# Patient Record
Sex: MALE | Employment: UNEMPLOYED | URBAN - METROPOLITAN AREA
[De-identification: names, ages, dates, MRNs, and addresses within clinical notes are randomized per-mention and may not be internally consistent; named-entity substitution may affect disease eponyms.]

---

## 2023-04-25 ENCOUNTER — HOSPITAL ENCOUNTER (OUTPATIENT)
Dept: MRI IMAGING | Age: 53
Discharge: HOME | End: 2023-04-25
Attending: ORTHOPAEDIC SURGERY
Payer: MEDICARE

## 2023-04-25 DIAGNOSIS — M23.92 DERANGEMENT, KNEE INTERNAL, LEFT: ICD-10-CM

## 2023-04-25 PROCEDURE — 73721 MRI JNT OF LWR EXTRE W/O DYE: CPT | Performed by: ORTHOPAEDIC SURGERY

## 2024-01-24 ENCOUNTER — HOSPITAL ENCOUNTER (OUTPATIENT)
Dept: GENERAL RADIOLOGY | Facility: HOSPITAL | Age: 54
Discharge: HOME | End: 2024-01-24
Attending: INTERNAL MEDICINE
Payer: MEDICARE

## 2024-01-24 DIAGNOSIS — M47.22 CERVICAL SPONDYLOSIS WITH RADICULOPATHY: ICD-10-CM

## 2024-01-24 PROCEDURE — 72040 X-RAY EXAM NECK SPINE 2-3 VW: CPT | Performed by: INTERNAL MEDICINE

## 2024-02-28 ENCOUNTER — APPOINTMENT (OUTPATIENT)
Dept: URBAN - METROPOLITAN AREA CLINIC 244 | Age: 54
Setting detail: DERMATOLOGY
End: 2024-02-28

## 2024-02-28 DIAGNOSIS — L663 OTHER SPECIFIED DISEASES OF HAIR AND HAIR FOLLICLES: ICD-10-CM

## 2024-02-28 DIAGNOSIS — L72.8 OTHER FOLLICULAR CYSTS OF THE SKIN AND SUBCUTANEOUS TISSUE: ICD-10-CM

## 2024-02-28 DIAGNOSIS — L738 OTHER SPECIFIED DISEASES OF HAIR AND HAIR FOLLICLES: ICD-10-CM

## 2024-02-28 PROBLEM — L02.223 FURUNCLE OF CHEST WALL: Status: ACTIVE | Noted: 2024-02-28

## 2024-02-28 PROBLEM — D48.5 NEOPLASM OF UNCERTAIN BEHAVIOR OF SKIN: Status: ACTIVE | Noted: 2024-02-28

## 2024-02-28 PROCEDURE — 11401 EXC TR-EXT B9+MARG 0.6-1 CM: CPT

## 2024-02-28 PROCEDURE — OTHER PRESCRIPTION: OTHER

## 2024-02-28 PROCEDURE — OTHER COUNSELING: OTHER

## 2024-02-28 PROCEDURE — 12031 INTMD RPR S/A/T/EXT 2.5 CM/<: CPT

## 2024-02-28 PROCEDURE — 99203 OFFICE O/P NEW LOW 30 MIN: CPT | Mod: 25

## 2024-02-28 PROCEDURE — OTHER PUNCH EXCISION: OTHER

## 2024-02-28 RX ORDER — CLINDAMYCIN PHOSPHATE 10 MG/G
GEL TOPICAL
Qty: 120 | Refills: 4 | Status: ERX | COMMUNITY
Start: 2024-02-28

## 2024-02-28 RX ORDER — DOXYCYCLINE HYCLATE 100 MG/1
CAPSULE, GELATIN COATED ORAL
Qty: 60 | Refills: 0 | Status: ERX | COMMUNITY
Start: 2024-02-28

## 2024-02-28 ASSESSMENT — LOCATION DETAILED DESCRIPTION DERM
LOCATION DETAILED: RIGHT POSTERIOR SHOULDER
LOCATION DETAILED: STERNUM

## 2024-02-28 ASSESSMENT — LOCATION ZONE DERM
LOCATION ZONE: TRUNK
LOCATION ZONE: ARM

## 2024-02-28 ASSESSMENT — LOCATION SIMPLE DESCRIPTION DERM
LOCATION SIMPLE: CHEST
LOCATION SIMPLE: RIGHT SHOULDER

## 2024-02-28 NOTE — PROCEDURE: PUNCH EXCISION
Bill 34339 For Specimen Handling/Conveyance To Laboratory?: no
Medical Necessity Clause: This procedure was medically necessary because the lesion that was treated was:
Nostril Rim Text: The closure involved the nostril rim.
8 Mm Punch Excision Text: A 8 mm punch biopsy was used to excise the lesion to the level of the subcutaneous fat.  Blunt dissection was used to free the lesion from the surrounding tissues and the lesion was removed.
Anesthesia Volume In Cc: 3
4.5 Mm Punch Excision Text: A 4.5 mm punch biopsy was used to excise the lesion to the level of the subcutaneous fat.  Blunt dissection was used to free the lesion from the surrounding tissues and the lesion was removed.
Helical Rim Text: The closure involved the helical rim.
Intermediate / Complex Repair - Final Wound Length In Cm: 1.1
12 Mm Punch Excision Text: A 12 mm punch biopsy was used to excise the lesion to the level of the subcutaneous fat.  Blunt dissection was used to free the lesion from the surrounding tissues and the lesion was removed.
Suture Removal: 14 days
Wound Care: Petrolatum
Notification Instructions: Patient will be notified of biopsy results. However, patient instructed to call the office if not contacted within 2 weeks.
2 Mm Punch Excision Text: A 2 mm punch biopsy was used to excise the lesion to the level of the subcutaneous fat.  Blunt dissection was used to free the lesion from the surrounding tissues and the lesion was removed.
Wound Dressings: a bandage
Epidermal Closure: simple interrupted
Debridement Text: The wound edges were debrided prior to proceeding with the closure to facilitate wound healing.
Post-Care Instructions: I reviewed with the patient in detail post-care instructions. Patient is to keep the biopsy site dry overnight, and then apply bacitracin twice daily until healed. Patient may apply hydrogen peroxide soaks to remove any crusting.
3 Mm Punch Excision Text: A 3 mm punch biopsy was used to excise the lesion to the level of the subcutaneous fat.  Blunt dissection was used to free the lesion from the surrounding tissues and the lesion was removed.
Path Notes (To The Dermatopathologist): Please check margins.
6 Mm Punch Excision Text: A 6 mm punch biopsy was used to excise the lesion to the level of the subcutaneous fat.  Blunt dissection was used to free the lesion from the surrounding tissues and the lesion was removed.
3.5 Mm Punch Excision Text: A 3.5 mm punch biopsy was used to excise the lesion to the level of the subcutaneous fat.  Blunt dissection was used to free the lesion from the surrounding tissues and the lesion was removed.
Undermining Type: Entire Wound
Deep Sutures: 3-0 Vicryl
Vermilion Border Text: The closure involved the vermilion border.
5 Mm Punch Excision Text: A 5 mm punch biopsy was used to excise the lesion to the level of the subcutaneous fat.  Blunt dissection was used to free the lesion from the surrounding tissues and the lesion was removed.
Retention Suture Text: Retention sutures were placed to support the closure and prevent dehiscence.
10 Mm Punch Excision Text: A 10 mm punch biopsy was used to excise the lesion to the level of the subcutaneous fat.  Blunt dissection was used to free the lesion from the surrounding tissues and the lesion was removed.
7 Mm Punch Excision Text: A 7 mm punch biopsy was used to excise the lesion to the level of the subcutaneous fat.  Blunt dissection was used to free the lesion from the surrounding tissues and the lesion was removed.
Punch Size In Mm: 6
Anesthesia Type: 0.5% lidocaine with 1:200,000 epinephrine and a 1:10 solution of 8.4% sodium bicarbonate
2.5 Mm Punch Excision Text: A 2.5 mm punch biopsy was used to excise the lesion to the level of the subcutaneous fat.  Blunt dissection was used to free the lesion from the surrounding tissues and the lesion was removed.
Epidermal Sutures: 4-0 Nylon
1.5 Mm Punch Excision Text: A 1.5 mm punch biopsy was used to excise the lesion to the level of the subcutaneous fat.  Blunt dissection was used to free the lesion from the surrounding tissues and the lesion was removed.
Size Of Margin In Cm: 0.1
Billing Type: Third-Party Bill
Lab Facility: [id number]
4 Mm Punch Excision Text: A 4 mm punch biopsy was used to excise the lesion to the level of the subcutaneous fat.  Blunt dissection was used to free the lesion from the surrounding tissues and the lesion was removed.
Hemostasis: None
Size Of Lesion (*Required): 0.4
Consent was obtained from the patient. The risks and benefits to therapy were discussed in detail. Specifically, the risks of infection, scarring, bleeding, prolonged wound healing, incomplete removal, allergy to anesthesia, nerve injury and recurrence were addressed. Prior to the procedure, the treatment site was clearly identified and confirmed by the patient.
Repair Type: Intermediate
Lab: [id number]
Detail Level: Detailed

## 2024-02-28 NOTE — HPI: RASH
How Severe Is Your Rash?: mild
Is This A New Presentation, Or A Follow-Up?: Rash
Additional History: Pt tried antibiotic for 30 days that helped last took two weeks ago

## 2024-03-14 ENCOUNTER — RX ONLY (RX ONLY)
Age: 54
End: 2024-03-14

## 2024-03-14 ENCOUNTER — APPOINTMENT (OUTPATIENT)
Dept: URBAN - METROPOLITAN AREA CLINIC 244 | Age: 54
Setting detail: DERMATOLOGY
End: 2024-03-17

## 2024-03-14 DIAGNOSIS — L738 OTHER SPECIFIED DISEASES OF HAIR AND HAIR FOLLICLES: ICD-10-CM

## 2024-03-14 DIAGNOSIS — L663 OTHER SPECIFIED DISEASES OF HAIR AND HAIR FOLLICLES: ICD-10-CM

## 2024-03-14 DIAGNOSIS — Z48.02 ENCOUNTER FOR REMOVAL OF SUTURES: ICD-10-CM

## 2024-03-14 PROBLEM — L02.223 FURUNCLE OF CHEST WALL: Status: ACTIVE | Noted: 2024-03-14

## 2024-03-14 PROCEDURE — OTHER SEPARATE AND IDENTIFIABLE DOCUMENTATION: OTHER

## 2024-03-14 PROCEDURE — OTHER PRESCRIPTION MEDICATION MANAGEMENT: OTHER

## 2024-03-14 PROCEDURE — OTHER SUTURE REMOVAL (GLOBAL PERIOD): OTHER

## 2024-03-14 PROCEDURE — OTHER COUNSELING: OTHER

## 2024-03-14 PROCEDURE — 99024 POSTOP FOLLOW-UP VISIT: CPT

## 2024-03-14 PROCEDURE — 99213 OFFICE O/P EST LOW 20 MIN: CPT | Mod: 24

## 2024-03-14 RX ORDER — CLINDAMYCIN PHOSPHATE 10 MG/G
GEL TOPICAL
Qty: 120 | Refills: 11 | Status: ERX

## 2024-03-14 RX ORDER — DOXYCYCLINE HYCLATE 100 MG/1
CAPSULE, GELATIN COATED ORAL
Qty: 60 | Refills: 2 | Status: ERX

## 2024-03-14 ASSESSMENT — LOCATION DETAILED DESCRIPTION DERM
LOCATION DETAILED: STERNUM
LOCATION DETAILED: RIGHT POSTERIOR SHOULDER

## 2024-03-14 ASSESSMENT — LOCATION ZONE DERM
LOCATION ZONE: ARM
LOCATION ZONE: TRUNK

## 2024-03-14 ASSESSMENT — LOCATION SIMPLE DESCRIPTION DERM
LOCATION SIMPLE: CHEST
LOCATION SIMPLE: RIGHT SHOULDER

## 2024-03-14 NOTE — PROCEDURE: SUTURE REMOVAL (GLOBAL PERIOD)
Detail Level: Detailed
Add 28102 Cpt? (Important Note: In 2017 The Use Of 77523 Is Being Tracked By Cms To Determine Future Global Period Reimbursement For Global Periods): yes

## 2024-03-14 NOTE — PROCEDURE: PRESCRIPTION MEDICATION MANAGEMENT
Detail Level: Zone
Render In Strict Bullet Format?: No
Continue Regimen: Clindamycin 1% topical gel BID as needed
Modify Regimen: doxycycline 100mg BID x 2-4 weeks for flares only

## 2024-07-11 ENCOUNTER — TELEPHONE (OUTPATIENT)
Dept: GASTROENTEROLOGY | Facility: CLINIC | Age: 54
End: 2024-07-11

## 2024-07-11 ENCOUNTER — OFFICE VISIT (OUTPATIENT)
Dept: GASTROENTEROLOGY | Facility: CLINIC | Age: 54
End: 2024-07-11
Payer: MEDICARE

## 2024-07-11 VITALS
SYSTOLIC BLOOD PRESSURE: 137 MMHG | DIASTOLIC BLOOD PRESSURE: 90 MMHG | WEIGHT: 211 LBS | HEART RATE: 74 BPM | BODY MASS INDEX: 29.54 KG/M2 | HEIGHT: 71 IN

## 2024-07-11 DIAGNOSIS — R10.84 DIFFUSE ABDOMINAL PAIN: ICD-10-CM

## 2024-07-11 DIAGNOSIS — K75.81 NASH (NONALCOHOLIC STEATOHEPATITIS): ICD-10-CM

## 2024-07-11 DIAGNOSIS — Z12.11 COLON CANCER SCREENING: ICD-10-CM

## 2024-07-11 DIAGNOSIS — K31.A0 GASTRIC INTESTINAL METAPLASIA: ICD-10-CM

## 2024-07-11 DIAGNOSIS — Z86.010 PERSONAL HISTORY OF COLONIC POLYPS: Primary | ICD-10-CM

## 2024-07-11 DIAGNOSIS — Z86.010 PERSONAL HISTORY OF COLONIC POLYPS: ICD-10-CM

## 2024-07-11 DIAGNOSIS — R10.9 ABDOMINAL PAIN, UNSPECIFIED ABDOMINAL LOCATION: Primary | ICD-10-CM

## 2024-07-11 PROCEDURE — 3008F BODY MASS INDEX DOCD: CPT | Performed by: INTERNAL MEDICINE

## 2024-07-11 PROCEDURE — 99204 OFFICE O/P NEW MOD 45 MIN: CPT | Performed by: INTERNAL MEDICINE

## 2024-07-11 PROCEDURE — 3075F SYST BP GE 130 - 139MM HG: CPT | Performed by: INTERNAL MEDICINE

## 2024-07-11 PROCEDURE — 3080F DIAST BP >= 90 MM HG: CPT | Performed by: INTERNAL MEDICINE

## 2024-07-11 RX ORDER — NAPROXEN 500 MG/1
1 TABLET ORAL 2 TIMES DAILY PRN
COMMUNITY
Start: 2021-10-13

## 2024-07-11 RX ORDER — OMEPRAZOLE 40 MG/1
40 CAPSULE, DELAYED RELEASE ORAL DAILY
COMMUNITY

## 2024-07-11 RX ORDER — PREDNISONE 5 MG/1
TABLET ORAL
COMMUNITY
Start: 2024-07-02

## 2024-07-11 RX ORDER — POLYETHYLENE GLYCOL 3350, SODIUM CHLORIDE, SODIUM BICARBONATE, POTASSIUM CHLORIDE 420; 11.2; 5.72; 1.48 G/4L; G/4L; G/4L; G/4L
POWDER, FOR SOLUTION ORAL
COMMUNITY
Start: 2024-06-11

## 2024-07-11 RX ORDER — FAMOTIDINE 10 MG
10 TABLET ORAL 2 TIMES DAILY
COMMUNITY

## 2024-07-11 RX ORDER — MELOXICAM 15 MG/1
15 TABLET ORAL DAILY PRN
COMMUNITY
Start: 2024-07-02

## 2024-07-11 RX ORDER — CLINDAMYCIN PHOSPHATE 10 MG/G
GEL TOPICAL
COMMUNITY
Start: 2024-07-10

## 2024-07-11 RX ORDER — ZOLPIDEM TARTRATE 5 MG/1
TABLET ORAL
COMMUNITY
Start: 2024-07-02

## 2024-07-11 RX ORDER — SILDENAFIL CITRATE 20 MG/1
TABLET ORAL
COMMUNITY
Start: 2022-07-10

## 2024-07-11 RX ORDER — LOSARTAN POTASSIUM 100 MG/1
100 TABLET ORAL DAILY
COMMUNITY
Start: 2024-04-18

## 2024-07-11 RX ORDER — DIAZEPAM 5 MG/1
TABLET ORAL
COMMUNITY
Start: 2024-07-02

## 2024-07-11 RX ORDER — ACETAMINOPHEN 500 MG
2 TABLET ORAL EVERY 6 HOURS PRN
COMMUNITY

## 2024-07-11 RX ORDER — METOPROLOL TARTRATE 100 MG/1
100 TABLET ORAL DAILY
COMMUNITY

## 2024-07-11 NOTE — H&P
[facility] - Gastroenterology                                                                                                               Reason for consult: abdominal pain, CRC screening    Requesting physician or provider: No primary care provider on file.    Chief Complaint   Patient presents with    Colonoscopy Screening    Abdominal Pain       HPI:   George Serna is a 54 year old year-old male here for the following:    Has chronic right and left upper abd pain since he was a child (10-12 years old).  It can worsen with food, capo if he eats too much meat.  His omeprazole was increased to 40 mg daily a few years ago. Pepcid was added at night as well about a few years ago, which helped.  He stopped smoking 10 years ago.      Has a h/o gastric intestinal metaplasia. Last EGD was in 2022.     He was on antibiotics for a rash which caused loose stools.  Since he completed it a week ago, his stool have been formed and normal.      Also with h/o NAFLD and used to see hepatology. He had a FibroScan 9/2022 that suggested F3 fibrosis, then had a US elastography 2023 that showed evidence of F2-F3 fibrosis.  Denies heavy alcohol use or h/o hepatitis.  LFTs were previously normal.     Patient currently denies any GI symptoms of nausea, vomiting, dyspepsia, dysphagia, hematemesis, abdominal pain, change in bowel habits, thin stools, hematochezia, or melena.  Additionally there is no weight loss and no reported history of chest pain or shortness of breath.      Denies family h/o CRC.     US liver with elastography 3/2023  Hepatic steatosis without definite hepatic mass.   Left mid pole likely renal calculus is decreased in size compared to prior exam   5/17/2022.   Fibrosis stage F2 by elastography         CT A/P 2021  IMPRESSION:  1. Bilateral renal calculi without ureteral stone or hydronephrosis.  2.  Fatty  liver.    Prior endoscopies:   CLN was in 2016 at [facility] and 2 small polyps were removed.      Soc:  -denies smoking  -denies Etoh  -no illicit drug use    Wt Readings from Last 6 Encounters:   07/11/24 211 lb (95.7 kg)        History, Medications, Allergies, ROS:      History reviewed. No pertinent past medical history.   Past Surgical History:   Procedure Laterality Date    Colonoscopy  05/20/2016    @ [facility]    Egd  01/16/2022    @ [facility]    Eg  09/28/2022    Egd  05/20/2016    @ [facility]      Family Hx:   Family History   Problem Relation Age of Onset    Cancer Mother 74        lung cancer      Social History:   Social History     Socioeconomic History    Marital status:    Tobacco Use    Smoking status: Former     Types: Cigarettes    Smokeless tobacco: Never   Substance and Sexual Activity    Alcohol use: Yes        Medications (Active prior to today's visit):  Current Outpatient Medications   Medication Sig Dispense Refill    zolpidem 5 MG Oral Tab TAKE 1 TO 2 TABLETS BY MOUTH EVERY OTHER NIGHT AT BEDTIME      sildenafil 20 MG Oral Tab       predniSONE 5 MG Oral Tab TAKE 1/2 TO 1 TABLET BY MOUTH IN THE MORNING FOR PAIN      Omeprazole 40 MG Oral Capsule Delayed Release Take 1 capsule (40 mg total) by mouth daily.      diazePAM 5 MG Oral Tab TAKE 1 OR 2 TABLETS BY MOUTH EVERY OTHER NIGHT AT BEDTIME AS NEEDED      Meloxicam 15 MG Oral Tab Take 1 tablet (15 mg total) by mouth daily as needed.      metoprolol tartrate 100 MG Oral Tab Take 1 tablet (100 mg total) by mouth daily.      losartan 100 MG Oral Tab Take 1 tablet (100 mg total) by mouth daily.      naproxen 500 MG Oral Tab Take 1 tablet (500 mg total) by mouth 2 (two) times daily as needed.      Clindamycin Phosphate 1 % External Gel       ascorbic acid 1000 MG Oral Tab Take 1 tablet (1,000 mg total) by mouth twice a week.      acetaminophen 500 MG Oral Tab Take 2 tablets (1,000 mg total) by mouth every 6 (six) hours as needed.      famotidine  10 MG Oral Tab Take 1 tablet (10 mg total) by mouth 2 (two) times daily.      PEG 3350-KCl-Na Bicarb-NaCl 420 g Oral Recon Soln MINUTES AND DRINK 8 OUNCES EVERY 15 MINUTES UNTIL DONE. START 4PM AND FINISH IN 4 HOURS         Allergies:  Allergies   Allergen Reactions    Latex RASH       ROS:   CONSTITUTIONAL:  negative for fevers, rigors  EYES:  negative for diplopia   RESPIRATORY:  negative for severe shortness of breath  CARDIOVASCULAR:  negative for crushing sub-sternal chest pain  GASTROINTESTINAL:  see HPI  GENITOURINARY:  negative for dysuria or gross hematuria  INTEGUMENT/BREAST:  SKIN:  negative for jaundice   ALLERGIC/IMMUNOLOGIC:  negative for hay fever  ENDOCRINE:  negative for cold intolerance and heat intolerance  MUSCULOSKELETAL:  negative for joint effusion/severe erythema  BEHAVIOR/PSYCH:  negative for psychotic behavior      PHYSICAL EXAM:   Blood pressure 137/90, pulse 74, height 5' 11\" (1.803 m), weight 211 lb (95.7 kg).    GEN - Patient appears comfortable and in no acute discomfort  ENT - MMM  EYES - the sclera appears anicteric  CV - no edema  RESP -  No increased work of breathing  ABDOMEN - soft, non-tender exam in all quadrants without rigidity or guarding, non-distended, no abnormal bowel sounds noted, no masses are palpated  SKIN - No jaundice  NEURO - Alert and appropriate, and gross movements of extremities normal  PSYCH - normal affect, non-agitated    Labs/Imaging:     Patient's pertinent labs and imaging were reviewed and discussed with patient today.      .  ASSESSMENT/PLAN:   George Serna is a 54 year old year-old male here for the following:    Chronic abdominal pain, GERD - the patient has chronic right and left upper abd pain since he was a child (10-12 years old).  It can worsen with food, capo if he eats too much meat.  His omeprazole was increased to 40 mg daily a few years ago. Pepcid was added at night as well about a few years ago, which helped.  Recommend continuing  current GERD regimen.      Gastric intestinal metaplasia -  Last EGD was in 2022. Recommend repeat EGD for mapping/surveillance in 2025.      NAFLD - used to see hepatology. He had a FibroScan 9/2022 that suggested F3 fibrosis, then had a US elastography 2023 that showed evidence of F2-F3 fibrosis.  Denies heavy alcohol use or h/o hepatitis.  LFTs were previously normal in 2023. Will recheck LFTs and order US elastography annually given previously borderline fibrosis.      Diarrhea - was self limited and likely related to the abx. Stools are now normal. Recommend continuing to monitor. If it recurs, recommend checking C.diff and stool cultures.    CRC surveillance, h/o colon polyps - last CLN was in 2016 and polyps were removed. Pt was advised to repeat a CLN in 5 years.  CLN ordered. He already has a prep at home from another provider.     Recommend    - CLN with [facility]    - CLD the day prior, NPO after midnight, split dose prep - pt already has one at home    - pt due for EGD in 2025    - LFTs and US liver elastography    - Follow up once the work-up above is completed     Colonoscopy consent: I have discussed the risks, benefits, and alternatives to colonoscopy with the patient/primary decision maker [who demonstrated understanding], including but not limited to the risks of bleeding, infection, pain, death, as well as the risks of anesthesia and perforation all leading to prolonged hospitalization, surgical intervention, or even death. I also specifically mentioned the miss rate of colonoscopy of 5-10% in the best of all circumstances. The patient has agreed to sign an informed consent and elected to proceed with colonoscopy with possible intervention [i.e. polypectomy, stent placement, etc.] as indicated.        Orders This Visit:  Orders Placed This Encounter   Procedures    Hepatic Function Panel (7)       Meds This Visit:  Requested Prescriptions      No prescriptions requested or ordered in this encounter        Imaging & Referrals:  US LIVER WITH ELASTOGRAPHY(CPT=76705,80777)         Hoda Noyola MD          This note was partially prepared using Dragon Medical voice recognition dictation software. As a result, errors may occur. When identified, these errors have been corrected. While every attempt is made to correct errors during dictation, discrepancies may still exist.

## 2024-07-11 NOTE — PATIENT INSTRUCTIONS
1. Schedule colonoscopy with [facility] [Diagnosis: abdominal pain, history of colon polyps]    2.  bowel prep from pharmacy (split dose prep you have at home)    3. Continue all medications for procedure    4. Read all bowel prep instructions carefully    5. AVOID seeds, nuts, popcorn, raw fruits and vegetables (cooked is okay) for 2-3 days before procedure    6. If you start any NEW medication after your visit today, please notify us. Certain medications will need to be held before the procedure, or the procedure cannot be performed.     7. You are due for a repeat EGD next year, 11/2025    8.  Please have the blood test and ultrasound done when able    9. Follow up with Dr. Noyola once these tests and the colonoscopy are done

## 2024-07-11 NOTE — TELEPHONE ENCOUNTER
Scheduled for: Colonoscopy 06451   Provider Name: Dr Noyola   Date: Mon 11/04/2024   Location: [facility]   Sedation: MAC   Time: 10:15 am, (pt is aware that [facility] will call the day before to confirm arrival time)  Prep: split golytely    Meds/Allergies Reconciled?: reviewed by provider   Diagnosis with codes: Hx colon polyps Z86.010, abdominal pain R10.9  Was patient informed to call insurance with codes (Y/N): Yes   Referral sent?: Yes, [facility] or [facility] notified?: Electronic case request was sent to [facility] via [employer]/do[employer].     Medication Orders: Instructions given in office and pt verbalized understanding     Misc Orders:       Further instructions given by staff: Instructions given in office and pt verbalized understanding     Instructions and Information about Your Health    1. Schedule colonoscopy with [facility] [Diagnosis: abdominal pain, history of colon polyps]     2.  bowel prep from pharmacy (split dose prep you have at home)     3. Continue all medications for procedure

## 2024-08-14 ENCOUNTER — TELEPHONE (OUTPATIENT)
Facility: CLINIC | Age: 54
End: 2024-08-14

## 2024-08-14 NOTE — TELEPHONE ENCOUNTER
1st,overdue reminder letter mailed out to patient   Labs and Imaging order   Orders Placed on 7/11/24    Hepatic Function Panel (7)  US LIVER WITH ELASTOGRAPHY(CPT=76705,93089)---  Date/Time Provider Department   8/19/24 9:00 AM  - 45 min ADO US RM1 (Resource) [facility]   Chong

## 2024-08-19 ENCOUNTER — LAB ENCOUNTER (OUTPATIENT)
Dept: LAB | Age: 54
End: 2024-08-19
Attending: INTERNAL MEDICINE
Payer: MEDICARE

## 2024-08-19 ENCOUNTER — HOSPITAL ENCOUNTER (OUTPATIENT)
Dept: ULTRASOUND IMAGING | Age: 54
Discharge: HOME | End: 2024-08-19
Attending: INTERNAL MEDICINE
Payer: MEDICARE

## 2024-08-19 ENCOUNTER — TELEPHONE (OUTPATIENT)
Age: 54
End: 2024-08-19

## 2024-08-19 DIAGNOSIS — K75.81 NASH (NONALCOHOLIC STEATOHEPATITIS): ICD-10-CM

## 2024-08-19 DIAGNOSIS — R93.2 ABNORMAL FINDING ON IMAGING OF LIVER: Primary | ICD-10-CM

## 2024-08-19 LAB
ALBUMIN SERPL-MCNC: 4.4 G/DL (ref 3.2–4.8)
ALP LIVER SERPL-CCNC: 57 U/L
ALT SERPL-CCNC: 18 U/L
AST SERPL-CCNC: 15 U/L (ref ?–34)
BILIRUB DIRECT SERPL-MCNC: 0.2 MG/DL (ref ?–0.3)
BILIRUB SERPL-MCNC: 0.5 MG/DL (ref 0.3–1.2)
PROT SERPL-MCNC: 6.9 G/DL (ref 5.7–8.2)

## 2024-08-19 PROCEDURE — 76981 USE PARENCHYMA: CPT | Performed by: INTERNAL MEDICINE

## 2024-08-19 PROCEDURE — 36415 COLL VENOUS BLD VENIPUNCTURE: CPT | Performed by: INTERNAL MEDICINE

## 2024-08-19 PROCEDURE — 76705 ECHO EXAM OF ABDOMEN: CPT | Performed by: INTERNAL MEDICINE

## 2024-08-19 PROCEDURE — 80076 HEPATIC FUNCTION PANEL: CPT | Performed by: INTERNAL MEDICINE

## 2024-08-19 NOTE — TELEPHONE ENCOUNTER
GI staff - please let him know that his US of the liver show improvement in the degree of fibrosis compared to last time, which is great.  There were two lesions of questionable significance for which the radiologist has recommended further imaging.  I recommend getting an MRI liver to evaluate this further, which has been ordered.  His LFTs are normal, which is great. He should continue healthy eating with whole foods and regular exercise.     Recommend    - MRI liver with and without contrast - he should lmk if he has metal in the body or pacemaker that could interfere with this or if he is claustrophobic      Thnx, SS

## 2024-08-20 NOTE — TELEPHONE ENCOUNTER
Called and spoke to patient. Patient states that he is claustrophobic so states that he does get open MRI's, but is willing to call and schedule since last MRI was completed at the [facility]

## 2024-09-20 ENCOUNTER — HOSPITAL ENCOUNTER (OUTPATIENT)
Dept: MRI IMAGING | Age: 54
Discharge: HOME | End: 2024-09-20
Attending: INTERNAL MEDICINE
Payer: MEDICARE

## 2024-09-20 DIAGNOSIS — R93.2 ABNORMAL FINDING ON IMAGING OF LIVER: ICD-10-CM

## 2024-09-20 PROCEDURE — A9575 INJ GADOTERATE MEGLUMI 0.1ML: HCPCS | Performed by: INTERNAL MEDICINE

## 2024-09-20 PROCEDURE — 74183 MRI ABD W/O CNTR FLWD CNTR: CPT | Performed by: INTERNAL MEDICINE

## 2024-09-20 RX ORDER — GADOTERATE MEGLUMINE 376.9 MG/ML
20 INJECTION INTRAVENOUS
Status: COMPLETED | OUTPATIENT
Start: 2024-09-20 | End: 2024-09-20

## 2024-09-20 RX ADMIN — GADOTERATE MEGLUMINE 20 ML: 376.9 INJECTION INTRAVENOUS at 08:14:00

## 2024-09-30 ENCOUNTER — TELEPHONE (OUTPATIENT)
Dept: GASTROENTEROLOGY | Facility: CLINIC | Age: 54
End: 2024-09-30

## 2024-09-30 DIAGNOSIS — R93.2 ABNORMAL FINDING ON IMAGING OF LIVER: Primary | ICD-10-CM

## 2024-10-01 NOTE — TELEPHONE ENCOUNTER
Spoke to patient  Relayed message     Patient verbalized understanding and had no further questions    Message sent to patient outreach to repeat ultrasound in December 2024

## 2024-10-01 NOTE — TELEPHONE ENCOUNTER
GI staff - please let the patient know that there was nothing worrisome on the MRI besides fat in the liver.  The radiologist recommended surveillance in 3 months with a repeat right upper quadrant abdominal ultrasound, so in December 2024.  I will place this order now.  Thanks, SS

## 2024-10-25 PROBLEM — I10 ESSENTIAL HYPERTENSION: Status: ACTIVE | Noted: 2019-09-10

## 2024-10-25 PROBLEM — F41.9 ANXIETY: Status: ACTIVE | Noted: 2019-09-10

## 2024-10-25 PROBLEM — F32.9 MAJOR DEPRESSION: Status: ACTIVE | Noted: 2019-09-10

## 2024-11-05 ENCOUNTER — TELEPHONE (OUTPATIENT)
Facility: CLINIC | Age: 54
End: 2024-11-05

## 2024-11-05 NOTE — TELEPHONE ENCOUNTER
1st Reminder letter was sent to patient mychart for orders pending:     US ABDOMEN LIMITED (CPT=76705) (Order #[id number]) on 9/30/24

## 2024-11-08 ENCOUNTER — TELEPHONE (OUTPATIENT)
Facility: CLINIC | Age: 54
End: 2024-11-08

## 2024-11-08 NOTE — TELEPHONE ENCOUNTER
Language line interpretor #[id number]    Spoke to patient    He asked if we can change alcohol use to \"former\" instead of active.  I changed it in the chart.    Patient verbalized understanding and had no further questions/needs

## 2024-11-08 NOTE — TELEPHONE ENCOUNTER
Patient called to speak with a nurse in regards to changing something on his medical records. Patient states that he was informed by medical records department that he would need to speak with Dr. Noyola's office in regards to making that change. Please call the patient.

## (undated) NOTE — LETTER
08/14/24        George Serna  [address] [address]      Estimado George Thao Serna,    Nuestros registros indican que tiene análisis clínicos pendientes y/o pruebas que se ordenaron en brown nombre que no se tyler completado.  Labs and Imaging order   Orders Placed on 7/11/24  Hepatic Function Panel (7)  US LIVER WITH ELASTOGRAPHY(CPT=76705,84108)---  Date/Time Provider Department   8/19/24 9:00 AM  - 45 min ADO US RM1 (Resource) [facility]    Please call [employer]  to scheduled this test order.  Address: [address] [place], [place] [id number]  Hours:   Closed ? Opens 7?AM Thu  Phone: [phone]  Para brindarle la mejor atención posible, por favor complete estos análisis/pruebas a la brevedad posible.    Si completó estos análisis/pruebas recientemente, por favor ignore esta carta.  Si tiene alguna pregunta, llame a la oficina al Dept: [phone].    Hoda Carlos MD

## (undated) NOTE — LETTER
11/5/2024          George Serna    [address] [address]         Dear George,    Our records indicate that the tests ordered for you by Hoda Noyola MD  have not been done.  If you have, in fact, already completed the tests or you do not wish to have the tests done, please contact our office at THE NUMBER LISTED BELOW.  Otherwise, please proceed with the testing.  Enclosed is a duplicate order for your convenience.    [facility] LIMITED (CPT=76705) (Order #[id number]) on 9/30/24     To schedule a test, call Central Scheduling at [phone]     Sincerely,    Hoda Noyola MD  [facility] [place]  [address]  [facility] [address] [phone]